# Patient Record
Sex: FEMALE | Race: WHITE | NOT HISPANIC OR LATINO | URBAN - METROPOLITAN AREA
[De-identification: names, ages, dates, MRNs, and addresses within clinical notes are randomized per-mention and may not be internally consistent; named-entity substitution may affect disease eponyms.]

---

## 2019-06-22 ENCOUNTER — EMERGENCY (EMERGENCY)
Facility: HOSPITAL | Age: 34
LOS: 1 days | Discharge: HOME | End: 2019-06-22
Attending: EMERGENCY MEDICINE | Admitting: EMERGENCY MEDICINE
Payer: SELF-PAY

## 2019-06-22 VITALS
OXYGEN SATURATION: 100 % | HEART RATE: 74 BPM | TEMPERATURE: 97 F | SYSTOLIC BLOOD PRESSURE: 107 MMHG | RESPIRATION RATE: 16 BRPM | DIASTOLIC BLOOD PRESSURE: 65 MMHG

## 2019-06-22 PROCEDURE — 99283 EMERGENCY DEPT VISIT LOW MDM: CPT

## 2019-06-22 PROCEDURE — 99053 MED SERV 10PM-8AM 24 HR FAC: CPT

## 2019-06-22 NOTE — ED PROVIDER NOTE - NSFOLLOWUPINSTRUCTIONS_ED_ALL_ED_FT
You were seen today for what is likely a viral illness. Please continue to stay hydrated. You can take 500mg of Tylenol every 6 hours as needed for muscle pains and throat pain.   If your cough worsens, you develop difficulty breathing, fevers, chills or any other concerning symptoms, please come back to the emergency room.  We have sent tessalon perles to your pharmacy. You can take them as a cough suppressant every 8 hours as needed.

## 2019-06-22 NOTE — ED PROVIDER NOTE - CARE PLAN
Principal Discharge DX:	Viral illness  Assessment and plan of treatment:	You were seen today for what is likely a viral illness. Please continue to stay hydrated. You can take 500mg of Tylenol every 6 hours as needed for muscle pains and throat pain.   If your cough worsens, you develop difficulty breathing, fevers, chills or any other concerning symptoms, please come back to the emergency room.  We have sent tessalon perles to

## 2019-06-22 NOTE — ED PROVIDER NOTE - CLINICAL SUMMARY MEDICAL DECISION MAKING FREE TEXT BOX
33F no PMH p/w cough with green sputum in setting of 5 day hx of ST, myalgias. Exam significant for posterior pharyngeal erythema. Afebrile. Likely viral illness. Discussed chest xray with patient. She does not want one at this time and states will come back if coughing worsens or she develops fevers.

## 2019-06-22 NOTE — ED PROVIDER NOTE - PLAN OF CARE
You were seen today for what is likely a viral illness. Please continue to stay hydrated. You can take 500mg of Tylenol every 6 hours as needed for muscle pains and throat pain.   If your cough worsens, you develop difficulty breathing, fevers, chills or any other concerning symptoms, please come back to the emergency room.  We have sent tessalon perles to

## 2019-06-22 NOTE — ED PROVIDER NOTE - ATTENDING CONTRIBUTION TO CARE
Dr. Hale: I have personally performed a face to face bedside history and physical examination of this patient. I have discussed the history, examination, review of systems, assessment and plan of management with the resident. I have reviewed the electronic medical record and amended it to reflect my history, review of systems, physical exam, assessment and plan.    33F with no sig pmh here with myalgias, sore throat x 5d, tactile fever 2d ago, and cough with greenish sputum since yesterday. No chills, cp, sob, rash, n/v/abd pain.    on exam  GEN - NAD; well appearing; A+O x3   HEAD - NC/AT     EYES - EOMI, no conjunctival pallor, no scleral icterus  ENT -   mucous membranes  moist , no discharge, +mild oropharyngeal erythema  NECK - Neck supple  PULM - CTA b/l,  symmetric breath sounds  COR -  RRR, S1 S2, no murmurs  ABD - , ND, NT, soft, no guarding, no rebound, no masses    BACK - no CVA tenderness, nontender spine     EXTREMS - no edema, no deformity, warm and well perfused    SKIN - no rash or bruising      NEUROLOGIC - alert, sensation nl, motor 5/5 RUE/LUE/RLE/LLE    likely viral syndrome, possibly acute bronchitis. low suspicion for pneumonia given no fever, normal O2 sat. pt offered a CXR but prefers to wait and see if her symptoms improve with supportive care.

## 2019-06-22 NOTE — ED PROVIDER NOTE - NSFOLLOWUPCLINICS_GEN_ALL_ED_FT
[facility] General Internal Medicine  General Internal Medicine  [address] [id number]  Phone: [phone]  Fax:   Follow Up Time:

## 2019-06-22 NOTE — ED PROVIDER NOTE - PHYSICAL EXAMINATION
General appearance: NAD, conversant, afebrile    Eyes: anicteric sclerae, moist conjunctivae; no lid-lag; Pupils equal, round and reactive to light; Extraocular muscles intact   HENT: Atraumatic; oropharynx clear with moist mucous membranes and no mucosal ulcerations; normal hard and soft palate; no pharyngeal erythema or exudate   Neck: Trachea midline; Full range of motion, supple; no thyromegaly or lymphadenopathy   Pulm: Lungs clear to auscultation bilaterally, with normal respiratory effort and no intercostal retractions; normal work of breathing   CV: Regular Rhythm and Rate; Normal S1, S2; No murmurs, rubs, or gallops. 2+ peripheral pulses.   Abdomen: Soft, non-tender, non-distended; no masses or hepatosplenomegaly.   Extremities: No peripheral edema or extremity lymphadenopathy. 5/5 strength in all four extremities.   Skin: Normal temperature, turgor and texture; no rash, ulcers or subcutaneous nodules   Psych: Appropriate affect, cooperative; alert and oriented to person, place and time General appearance: NAD, conversant, afebrile    Eyes: anicteric sclerae, moist conjunctivae; no lid-lag; Pupils equal, round and reactive to light; Extraocular muscles intact   HENT: Atraumatic; +posterior pharyngeal erythema   Neck: Trachea midline; Full range of motion, supple; no thyromegaly or lymphadenopathy. No cervical LN   Pulm: Lungs clear to auscultation bilaterally, with normal respiratory effort and no intercostal retractions; normal work of breathing   CV: Regular Rhythm and Rate; Normal S1, S2;   Abdomen: Soft, non-tender, non-distended   Extremities: No peripheral edema   Skin: Normal temperature, turgor and texture; no rash, ulcers or subcutaneous nodules   Psych: Appropriate affect, cooperative; alert and oriented to person, place and time

## 2019-06-22 NOTE — ED PROVIDER NOTE - OBJECTIVE STATEMENT
33 year old female no PMH presenting with 5 day history of sore throat associated with myalgias and chills. Had tactile fever 2 days ago. Presenting today because yesterday she was coughing up green phlegm. Improved today, but wanted to get checked out. No recorded fevers or chills. No nausea or vomiting or abdominal pain. No recent travel. No sick contacts. Has used Motrin before today for aches.

## 2019-06-22 NOTE — ED PROVIDER NOTE - NS ED ROS FT
General: denies fever, chills, weight loss/weight gain.  HENT: +throat pain.  Eyes: denies visual changes, blurred vision, eye discharge, eye redness  Neck: denies neck pain, neck swelling  CV: +cough. No shortness of breath.   Resp: denies difficulty breathing, cough  Abdominal: denies nausea, vomiting, diarrhea, abdominal pain, blood in stool, dark stool  MSK: +muscle aches  Neuro: denies headaches, numbness, tingling, dizziness, lightheadedness.  Skin: denies rashes, cuts, bruises  Hematologic: denies unexplained bruises

## 2021-09-17 NOTE — ED PROVIDER NOTE - DISPOSITION TYPE
Impression: Exdtve age-rel mclr degn, right eye, with inact chrdl neovas: [id number]. OD. Condition: established, stable. Vision: vision affected. Last ROGER Tx OD 11/09/2017 w/ Dr Katlin Garcia Plan: Due to Coronavirus COVID-19 pandemic and National Emergency, deferred Slit Lamp examination. Findings are based on OCT and Optos. OCT OD shows no leakage - stable and Optos OD confirms findings. No treatment is required at this time. Recommend a retina follow - up in 6 mos. sooner if there is a sudden change in vision. DISCHARGE